# Patient Record
Sex: FEMALE | Race: WHITE
[De-identification: names, ages, dates, MRNs, and addresses within clinical notes are randomized per-mention and may not be internally consistent; named-entity substitution may affect disease eponyms.]

---

## 2018-12-05 ENCOUNTER — HOSPITAL ENCOUNTER (INPATIENT)
Dept: HOSPITAL 47 - 4FBP | Age: 23
LOS: 2 days | Discharge: HOME | End: 2018-12-07
Attending: OBSTETRICS & GYNECOLOGY | Admitting: OBSTETRICS & GYNECOLOGY
Payer: COMMERCIAL

## 2018-12-05 VITALS — BODY MASS INDEX: 39.8 KG/M2

## 2018-12-05 DIAGNOSIS — Z3A.39: ICD-10-CM

## 2018-12-05 DIAGNOSIS — O36.63X0: Primary | ICD-10-CM

## 2018-12-05 DIAGNOSIS — Z23: ICD-10-CM

## 2018-12-05 DIAGNOSIS — Z79.899: ICD-10-CM

## 2018-12-05 LAB
BASOPHILS # BLD AUTO: 0 K/UL (ref 0–0.2)
BASOPHILS NFR BLD AUTO: 0 %
EOSINOPHIL # BLD AUTO: 0.1 K/UL (ref 0–0.7)
EOSINOPHIL NFR BLD AUTO: 1 %
ERYTHROCYTE [DISTWIDTH] IN BLOOD BY AUTOMATED COUNT: 4.04 M/UL (ref 3.8–5.4)
ERYTHROCYTE [DISTWIDTH] IN BLOOD: 15.9 % (ref 11.5–15.5)
HCT VFR BLD AUTO: 36.1 % (ref 34–46)
HGB BLD-MCNC: 11.4 GM/DL (ref 11.4–16)
LYMPHOCYTES # SPEC AUTO: 2 K/UL (ref 1–4.8)
LYMPHOCYTES NFR SPEC AUTO: 17 %
MCH RBC QN AUTO: 28.2 PG (ref 25–35)
MCHC RBC AUTO-ENTMCNC: 31.6 G/DL (ref 31–37)
MCV RBC AUTO: 89.3 FL (ref 80–100)
MONOCYTES # BLD AUTO: 0.5 K/UL (ref 0–1)
MONOCYTES NFR BLD AUTO: 4 %
NEUTROPHILS # BLD AUTO: 9.1 K/UL (ref 1.3–7.7)
NEUTROPHILS NFR BLD AUTO: 76 %
PLATELET # BLD AUTO: 280 K/UL (ref 150–450)
WBC # BLD AUTO: 11.9 K/UL (ref 3.8–10.6)

## 2018-12-05 PROCEDURE — 86900 BLOOD TYPING SEROLOGIC ABO: CPT

## 2018-12-05 PROCEDURE — 90707 MMR VACCINE SC: CPT

## 2018-12-05 PROCEDURE — 00HU33Z INSERTION OF INFUSION DEVICE INTO SPINAL CANAL, PERCUTANEOUS APPROACH: ICD-10-PCS

## 2018-12-05 PROCEDURE — 85025 COMPLETE CBC W/AUTO DIFF WBC: CPT

## 2018-12-05 PROCEDURE — 86850 RBC ANTIBODY SCREEN: CPT

## 2018-12-05 PROCEDURE — 3E0R3BZ INTRODUCTION OF ANESTHETIC AGENT INTO SPINAL CANAL, PERCUTANEOUS APPROACH: ICD-10-PCS

## 2018-12-05 PROCEDURE — 90471 IMMUNIZATION ADMIN: CPT

## 2018-12-05 PROCEDURE — 3E0134Z INTRODUCTION OF SERUM, TOXOID AND VACCINE INTO SUBCUTANEOUS TISSUE, PERCUTANEOUS APPROACH: ICD-10-PCS

## 2018-12-05 PROCEDURE — 88307 TISSUE EXAM BY PATHOLOGIST: CPT

## 2018-12-05 PROCEDURE — 86901 BLOOD TYPING SEROLOGIC RH(D): CPT

## 2018-12-05 RX ADMIN — POTASSIUM CHLORIDE SCH MLS/HR: 14.9 INJECTION, SOLUTION INTRAVENOUS at 13:25

## 2018-12-05 RX ADMIN — POTASSIUM CHLORIDE SCH MLS/HR: 14.9 INJECTION, SOLUTION INTRAVENOUS at 06:23

## 2018-12-05 RX ADMIN — POTASSIUM CHLORIDE SCH MLS/HR: 14.9 INJECTION, SOLUTION INTRAVENOUS at 20:43

## 2018-12-05 NOTE — P.OP
Date of Procedure: 18


Preoperative Diagnosis: 


IUP at 39 and 6/7 weeks, suspected LGA, arrest of first stage of labor


Postoperative Diagnosis: 


Same plus meconium-stained fluid


Procedure(s) Performed: 


Primary low transverse  section


Anesthesia: epidural


Surgeon: Anamaria Medley


Assistant #1: Kinsey Dwyer


Estimated Blood Loss (ml): 600


IV fluids (ml): 800


Urine output (ml): 150


Pathology: other (Placenta)


Condition: stable


Disposition: PACU


Indications for Procedure: 


Patient was noted to be 6 cm for greater than 4 hours with no further descent 

of the fetal head


Operative Findings: 


Meconium-stained fluid, uterus, placenta, normal uterus tubes and ovaries were 

appreciated


Description of Procedure: 


The patient was prepped and draped in the usual fashion after epidural 

anesthesia was found to be adequate.  A Pfannenstiel incision was made and 

extended of the abdominal cavity without difficulty.  The bladder peritoneum 

was elevated and incised and reflected distally.  A 2 cm incision was made in 

the transverse plane of the lower uterine segment to enter the uterus at which 

time meconium stained  fluid was noted.  The incision was extended in both 

directions using the bandage scissors.  The fetal head was encountered within 

the field and delivered up and through the incision where the nose and mouth 

were thoroughly suctioned.  Remainder of the infant was delivered onto the 

surgical field where the cord was doubly clamped, cut, and the infant was 

passed for resuscitative measures with weight 8-13 and Apgars of 8-9 at 1 and 5 

minutes respectively.    The placenta was delivered manually, intact, and was 

grossly normal with a grossly normal three-vessel cord.  The uterus was 

exteriorized and the interior cavity of the uterus swept of any remaining 

placental and membranous fragments with a laparotomy sponge.  The margins of 

the incision were grasped with Allis clamps and the incision closed in 2 

layers.  First layer was a running locking layer of 0 vicryl from margin to 

margin followed by a second layer of imbricating 0 vicryl from margin to 

margin.  Any small points of bleeding were then made hemostatic with the Bovie.

  Once hemostasis was achieved, the posterior cul-de-sac was suctioned with a 

guard and the uterine and ovarian findings are as noted above.  The uterus was 

replaced within the abdominal cavity and the gutters swept of any remaining 

blood fluid or clot.  The incision was again reexamined and hemostasis was 

noted to be excellent.  Any small point of bleeding were made hemostatic with 

the Bovie.  Once hemostasis was achieved the parietal peritoneum was loosely 

reapproximated.  The layer of muscles were examined and made hemostatic with 

the Bovie.  Attention was then turned to the fascia which was closed with 2 

running stitches of 0 Vicryl proceeding from the lateral margins to the 

midpoint.  The subcutaneous tissues were irrigated, made hemostatic with the 

Bovie, and reapproximated with a running stitch of 3-0 vicryl.  The skin was 

reapproximated with 4-0 vicryl.  Estimated blood loss for the case was 

approximately 600 mL.  All sponge instrument and needle counts are correct.  

There were no complications.  The patient tolerated the procedure well and 

proceeded to the recovery room in stable condition.  Both mother and infant are 

resting comfortably in recovery.

## 2018-12-05 NOTE — P.HPOB
History of Present Illness


H&P Date: 18


Chief Complaint: IUP @ 39 6/7 weeks





This is a very pleasant 23-year-old  1 para 0 at 39-6/7 weeks with an 

estimated due date of 6 based on last menstrual period.  Patient has been 

receiving routine prenatal care with myself since 10 weeks of gestation.  

Patient had an ultrasound yesterday for size greater than dates and was noted 

to have an EFW of 8 lbs. 10 oz. with an LUCIANO of 20.  Cervix was noted to be 

favorable 2-3 cm/50/-3 station soft and anterior patient elected induction.





On prenatal blood work patient had a blood type of A+, rubella nonimmune we'll 

plan immunization post delivery, RPR is nonreactive, hepatitis B surface 

antigen is negative, HIV is negative, she did pass her 1 hour GDS she also 

elected to have genetic screening done in the first trimester which was negative

, group beta strep is negative.








Review of Systems


Constitutional: Denies chills, Denies fatigue, Denies fever


Ears, nose, mouth and throat: Denies headache


Cardiovascular: Reports leg edema


Respiratory: Denies cough, Denies dyspnea


Gastrointestinal: Denies constipation, Denies diarrhea, Denies nausea, Denies 

vomiting


Genitourinary: Reports pregnant





Past Medical History


Past Medical History: No Reported History


History of Any Multi-Drug Resistant Organisms: None Reported


Past Surgical History: No Surgical Hx Reported


Past Anesthesia/Blood Transfusion Reactions: No Reported Reaction


Past Psychological History: No Psychological Hx Reported


Smoking Status: Never smoker


Past Alcohol Use History: None Reported


Past Drug Use History: None Reported





- Past Family History


  ** Father


Family Medical History: No Reported History





Medications and Allergies


 Home Medications











 Medication  Instructions  Recorded  Confirmed  Type


 


Pnv No.95/Ferrous Fum/Folic AC 1 tab PO ONCE 18 History





[Prenatal Multivitamin Tablet]    











 Allergies











Allergy/AdvReac Type Severity Reaction Status Date / Time


 


No Known Allergies Allergy   Verified 18 06:06














Exam


Osteopathic Statement: *.  No significant issues noted on an osteopathic 

structural exam other than those noted in the History and Physical/Consult.


 Vital Signs











  Temp Pulse Resp BP Pulse Ox


 


 18 06:09  95.8 F L  94  16  131/82  99








 Intake and Output











 18





 22:59 06:59 14:59


 


Other:   


 


  Weight  105.233 kg 














Targeted physical exam was performed on this date in general this is a well-

nourished well-developed pregnant female in no acute distress, heart has 

regular rate and rhythm, lungs were noted to be clear to auscultation 

bilaterally, abdomen is noted to be gravid and appropriate for gestational age, 

extremities 1+ edema, fetal heart tones are noted to be reactive and she is 

denice every 3 minutes.  On cervical exam her cervix was noted to be 3/50/-

2 amniotomy was performed and clear fluid was obtained without difficulty





Results


Result Diagrams: 


 18 06:20





 Abnormal Lab Results - Last 24 Hours (Table)











  18 Range/Units





  06:20 


 


WBC  11.9 H  (3.8-10.6)  k/uL


 


RDW  15.9 H  (11.5-15.5)  %


 


Neutrophils #  9.1 H  (1.3-7.7)  k/uL














Assessment and Plan


(1) Term pregnancy


Current Visit: Yes   Status: Acute   Code(s): Z34.80 - ENCOUNTER FOR SUPRVSN OF 

NORMAL PREGNANCY, UNSP TRIMESTER   SNOMED Code(s): 75906946


   


Plan: 





Plan Pitocin induction of labor, Stadol as needed for pain control versus 

epidural as patient request.  Anticipate spontaneous vaginal delivery later 

this afternoon.

## 2018-12-06 LAB
BASOPHILS # BLD AUTO: 0 K/UL (ref 0–0.2)
BASOPHILS NFR BLD AUTO: 0 %
EOSINOPHIL # BLD AUTO: 0.1 K/UL (ref 0–0.7)
EOSINOPHIL NFR BLD AUTO: 1 %
ERYTHROCYTE [DISTWIDTH] IN BLOOD BY AUTOMATED COUNT: 3.33 M/UL (ref 3.8–5.4)
ERYTHROCYTE [DISTWIDTH] IN BLOOD: 15.9 % (ref 11.5–15.5)
HCT VFR BLD AUTO: 30 % (ref 34–46)
HGB BLD-MCNC: 9.6 GM/DL (ref 11.4–16)
LYMPHOCYTES # SPEC AUTO: 1.5 K/UL (ref 1–4.8)
LYMPHOCYTES NFR SPEC AUTO: 14 %
MCH RBC QN AUTO: 28.8 PG (ref 25–35)
MCHC RBC AUTO-ENTMCNC: 32 G/DL (ref 31–37)
MCV RBC AUTO: 90.1 FL (ref 80–100)
MONOCYTES # BLD AUTO: 0.5 K/UL (ref 0–1)
MONOCYTES NFR BLD AUTO: 5 %
NEUTROPHILS # BLD AUTO: 8.3 K/UL (ref 1.3–7.7)
NEUTROPHILS NFR BLD AUTO: 79 %
PLATELET # BLD AUTO: 205 K/UL (ref 150–450)
WBC # BLD AUTO: 10.5 K/UL (ref 3.8–10.6)

## 2018-12-06 RX ADMIN — POTASSIUM CHLORIDE SCH: 14.9 INJECTION, SOLUTION INTRAVENOUS at 01:56

## 2018-12-06 RX ADMIN — IBUPROFEN PRN MG: 600 TABLET ORAL at 17:20

## 2018-12-06 RX ADMIN — IBUPROFEN PRN MG: 600 TABLET ORAL at 10:58

## 2018-12-06 RX ADMIN — POTASSIUM CHLORIDE SCH: 14.9 INJECTION, SOLUTION INTRAVENOUS at 22:31

## 2018-12-06 RX ADMIN — DOCUSATE SODIUM AND SENNOSIDES SCH EACH: 50; 8.6 TABLET ORAL at 08:39

## 2018-12-06 RX ADMIN — DOCUSATE SODIUM AND SENNOSIDES SCH: 50; 8.6 TABLET ORAL at 01:56

## 2018-12-06 RX ADMIN — IBUPROFEN PRN MG: 600 TABLET ORAL at 23:35

## 2018-12-06 RX ADMIN — POTASSIUM CHLORIDE SCH MLS/HR: 14.9 INJECTION, SOLUTION INTRAVENOUS at 04:16

## 2018-12-06 RX ADMIN — DOCUSATE SODIUM AND SENNOSIDES SCH: 50; 8.6 TABLET ORAL at 22:32

## 2018-12-06 RX ADMIN — POTASSIUM CHLORIDE SCH: 14.9 INJECTION, SOLUTION INTRAVENOUS at 18:45

## 2018-12-06 NOTE — P.PNOBGPC
Subjective





- Subjective


Principal diagnosis: POD 1 LTCS arrest of labor


Interval history: 





Pt did well overnight, pain is controlled.  she is bottlefeeding.  she has had 

a spontaneous void since eng was d/c.  she notes lochia to be minimal.


she is tolerating clear liquids wihtout n/v.  


she denies concerns 


Patient reports: Reports appetite normal, Reports voiding normally, Reports 

pain well controlled, Reports ambulating normally


: doing well, bottle feeding





Objective





- Vital Signs


Latest vital signs: 


 Vital Signs











  Temp Pulse Resp BP BP Pulse Ox


 


 18 07:49  98.1 F  97  14  133/82  


 


 18 05:00    15    97


 


 18 04:00  98 F  81  15   124/75 


 


 18 03:00    15    98


 


 18 01:00    16   


 


 18 00:00  98.1 F  92  15   138/91 


 


 18 23:00    15    98


 


 18 21:34    16   


 


 18 21:00  96.8 F L  96  18   119/69  97


 


 18 20:30  97.4 F L  110 H  18   121/79  97


 


 18 20:00   96  18   126/57  97


 


 18 19:45   82  18   132/77  97


 


 18 19:35    18    98


 


 18 19:30  98.3 F  84  16   127/72  97


 


 18 19:15   89  18   124/69  98


 


 18 19:00  98.4 F  86  18   118/68  97








 Intake and Output











 18





 22:59 06:59 14:59


 


Intake Total 400  600


 


Output Total 100 350 300


 


Balance 300 -350 300


 


Intake:   


 


  Intake, IV Titration 400  





  Amount   


 


    ACETAMINOPHEN IV (For   





    ) 1,000 mg In Empty Bag 1   





    bag @ 400 mls/hr IVPB   





    ONCE ONE Rx#:[id number]   


 


  Lipid   600


 


    Lactated Ringers 1,000 ml   600





    @ 125 mls/hr IV .Q8H [facility]   





    Rx#:[id number]   


 


Output:   


 


  Urine 100 350 300


 


    Uretheral (Eng)  350 


 


Other:   


 


  Voiding Method Indwelling Catheter  


 


  # Voids   1














- Exam


Extremities: Present: normal


Abdomen: Present: normal appearance, soft


Incision: Present: normal, dry, intact


Uterus: Present: normal, firm





- Labs


Labs: 


 Abnormal Lab Results - Last 24 Hours (Table)











  18 Range/Units





  08:02 


 


RBC  3.33 L  (3.80-5.40)  m/uL


 


Hgb  9.6 L D  (11.4-16.0)  gm/dL


 


Hct  30.0 L  (34.0-46.0)  %


 


RDW  15.9 H  (11.5-15.5)  %


 


Neutrophils #  8.3 H  (1.3-7.7)  k/uL














Assessment and Plan


(1) Term pregnancy


Current Visit: Yes   Status: Acute   Code(s): Z34.80 - ENCOUNTER FOR SUPRVSN OF 

NORMAL PREGNANCY, UNSP TRIMESTER   SNOMED Code(s): 66767680


   





(2) LGA (large for gestational age) fetus


Current Visit: Yes   Status: Acute   Code(s): PIQ2835 -    SNOMED Code(s): 

542672716


   





(3) S/P  section


Current Visit: Yes   Status: Acute   Code(s): Z98.891 - HISTORY OF UTERINE SCAR 

FROM PREVIOUS SURGERY   SNOMED Code(s): 933381563


   


Plan: 





will continue routine post operative care, and anticipate d/c home of she does 

well today

## 2018-12-06 NOTE — P.PN
Progress Note - Text


Progress Note Date: 18


22 yo F status post . Post-op day #1. Patient received intrathecal 

Duramorph through epidural catheter. Patient was seen today, sitting up in bed 

no complaints, pain VAS score 2/10, no headache, no itching, no nausea and 

vomiting.


Assessment and plan: Doing well in general no complications from anesthesia

## 2018-12-07 VITALS
DIASTOLIC BLOOD PRESSURE: 71 MMHG | RESPIRATION RATE: 18 BRPM | TEMPERATURE: 97.8 F | HEART RATE: 92 BPM | SYSTOLIC BLOOD PRESSURE: 117 MMHG

## 2018-12-07 RX ADMIN — HYDROCODONE BITARTRATE AND ACETAMINOPHEN PRN EACH: 5; 325 TABLET ORAL at 02:39

## 2018-12-07 RX ADMIN — HYDROCODONE BITARTRATE AND ACETAMINOPHEN PRN EACH: 5; 325 TABLET ORAL at 10:37

## 2018-12-07 RX ADMIN — IBUPROFEN PRN MG: 600 TABLET ORAL at 07:41

## 2018-12-07 RX ADMIN — DOCUSATE SODIUM AND SENNOSIDES SCH EACH: 50; 8.6 TABLET ORAL at 07:41

## 2018-12-07 RX ADMIN — POTASSIUM CHLORIDE SCH: 14.9 INJECTION, SOLUTION INTRAVENOUS at 06:16

## 2018-12-07 NOTE — P.DS
Providers


Date of admission: 


18 06:04





Expected date of discharge: 18


Attending physician: 


Anamaria Medley





Primary care physician: 


Stated None








- Discharge Diagnosis(es)


(1) Term pregnancy


Current Visit: Yes   Status: Acute   





(2) LGA (large for gestational age) fetus


Current Visit: Yes   Status: Acute   





(3) S/P  section


Current Visit: Yes   Status: Acute   


Hospital Course: 





This is a 23-year-old  1 para 0 at 39 6 when she presented to labor and 

delivery for induction of labor for suspected LGA.  Patient was admitted to 

labor and delivery and was noted to be 3 cm dilated she was denice 

regularly is Pitocin has been started 2 hours prior to me evaluating the 

patient.  Patient underwent amniotomy and clear fluid was obtained.  Patient 

progressed throughout labor and eventually became uncomfortable requesting an 

epidural.  Anesthesia came in place at epidural without difficulty and she was 

comfortable afterwards.  Just after the epidural around 12:30 1:00 patient was 

noted to be 5-6 cm patient stalled at that dilation and around 6:00 was checked 

and felt to be the same dilation.  Patient was then counseled as to primary low 

transverse  section secondary to arrest of first stage of labor.  

Patient agreed and  was performed.  For further details on the C-

section please see the operative report.  Patient delivered a viable female 

infant at 1831, weight of 8 lbs. 13 oz. and Apgars of 8 and 9 at one and 5 

minutes respectively.  Patient's postoperative course has been uneventful.  She 

is ambulating and voiding without difficulty.  She is tolerating a regular diet 

without nausea or vomiting.  She states her pain is controlled with oral 

medications..  She is bottle feeding at this time.  She is tired but states she 

is ready to go home on this postop day #2.


Patient Condition at Discharge: Good





Plan - Discharge Summary


New Discharge Prescriptions: 


No Action


   Pnv No.95/Ferrous Fum/Folic AC [Prenatal Multivitamin Tablet] 1 tab PO ONCE


Discharge Medication List





Pnv No.95/Ferrous Fum/Folic AC [Prenatal Multivitamin Tablet] 1 tab PO ONCE  [History]








Follow up Appointment(s)/Referral(s): 


Anamaria Medley DO [Doctor of Osteopathic Medicine] - 2 Weeks


Patient Instructions/Handouts:   (DC),  (GEN)

## 2020-08-13 ENCOUNTER — HOSPITAL ENCOUNTER (INPATIENT)
Dept: HOSPITAL 47 - 4FBP | Age: 25
LOS: 2 days | Discharge: HOME | End: 2020-08-15
Attending: OBSTETRICS & GYNECOLOGY | Admitting: OBSTETRICS & GYNECOLOGY
Payer: COMMERCIAL

## 2020-08-13 VITALS — BODY MASS INDEX: 43.7 KG/M2

## 2020-08-13 DIAGNOSIS — O36.63X0: ICD-10-CM

## 2020-08-13 DIAGNOSIS — O34.211: Primary | ICD-10-CM

## 2020-08-13 DIAGNOSIS — Z3A.39: ICD-10-CM

## 2020-08-13 LAB
BASOPHILS # BLD AUTO: 0 K/UL (ref 0–0.2)
BASOPHILS NFR BLD AUTO: 0 %
EOSINOPHIL # BLD AUTO: 0.1 K/UL (ref 0–0.7)
EOSINOPHIL NFR BLD AUTO: 1 %
ERYTHROCYTE [DISTWIDTH] IN BLOOD BY AUTOMATED COUNT: 4.07 M/UL (ref 3.8–5.4)
ERYTHROCYTE [DISTWIDTH] IN BLOOD: 15.3 % (ref 11.5–15.5)
HCT VFR BLD AUTO: 35.4 % (ref 34–46)
HGB BLD-MCNC: 11.2 GM/DL (ref 11.4–16)
LYMPHOCYTES # SPEC AUTO: 1.6 K/UL (ref 1–4.8)
LYMPHOCYTES NFR SPEC AUTO: 18 %
MCH RBC QN AUTO: 27.6 PG (ref 25–35)
MCHC RBC AUTO-ENTMCNC: 31.7 G/DL (ref 31–37)
MCV RBC AUTO: 87.1 FL (ref 80–100)
MONOCYTES # BLD AUTO: 0.3 K/UL (ref 0–1)
MONOCYTES NFR BLD AUTO: 4 %
NEUTROPHILS # BLD AUTO: 6.6 K/UL (ref 1.3–7.7)
NEUTROPHILS NFR BLD AUTO: 75 %
PLATELET # BLD AUTO: 242 K/UL (ref 150–450)
WBC # BLD AUTO: 8.8 K/UL (ref 3.8–10.6)

## 2020-08-13 PROCEDURE — 85025 COMPLETE CBC W/AUTO DIFF WBC: CPT

## 2020-08-13 PROCEDURE — 86850 RBC ANTIBODY SCREEN: CPT

## 2020-08-13 PROCEDURE — 86901 BLOOD TYPING SEROLOGIC RH(D): CPT

## 2020-08-13 PROCEDURE — 86900 BLOOD TYPING SEROLOGIC ABO: CPT

## 2020-08-13 RX ADMIN — POTASSIUM CHLORIDE SCH: 14.9 INJECTION, SOLUTION INTRAVENOUS at 21:22

## 2020-08-13 RX ADMIN — POTASSIUM CHLORIDE SCH MLS/HR: 14.9 INJECTION, SOLUTION INTRAVENOUS at 19:35

## 2020-08-13 RX ADMIN — POTASSIUM CHLORIDE SCH: 14.9 INJECTION, SOLUTION INTRAVENOUS at 21:24

## 2020-08-13 RX ADMIN — DOCUSATE SODIUM AND SENNOSIDES SCH EACH: 50; 8.6 TABLET ORAL at 21:16

## 2020-08-13 NOTE — P.OP
Date of Procedure: 20


Preoperative Diagnosis: 


IUP at 39 0/sevenths weeks, history of  1, desires repeat


Postoperative Diagnosis: 


Same


Procedure(s) Performed: 


Elective repeat  section


Anesthesia: spinal


Surgeon: Anamaria Medley


Assistant #1: Cain Gage


Estimated Blood Loss (ml): 460


IV fluids (ml): 1,000


Urine output (ml): 300


Pathology: none sent


Condition: stable


Disposition: observation


Indications for Procedure: 


This pleasant 24-year-old  2 para 1001 has a history of a primary C-

section secondary to arrest of first stage of labor, patient elected repeat C-

section secondary to suspected large for gestational age infant.


Operative Findings: 


Normal uterus tubes and ovaries were appreciated.  A thin lower uterine segment 

was noted, liveborn infant male delivered at 1229, weight of 9 lbs. 5 oz. via 

vacuum assist,


Description of Procedure: 


Patient was taken back to the operating suite where spinal anesthesia was found 

be adequate by the anesthesia department.  She was then prepped and draped in 

normal sterile fashion in the dorsal supine position.  A Pfannenstiel skin 

incision was made the scalpel and carried through to the underlying layer of 

fascia.  A significant amount of citric's was noted within the subcutaneous 

tissue.  The fascia was then incised in the midline and the incision was 

extended laterally.  The superior aspect of the fascial incision was then 

grasped with Stanwood clamps, elevated and underlying rectus muscles dissected off 

sharply.  Attention was then turned to the inferior aspect of the fascial 

incision which was grasped with Stanwood clamps, elevated and underlying rectus 

muscles dissected off sharply once again.  The rectus muscles were  in 

the midline the peritoneum was identified and entered.  This incision was then 

extended superiorly and inferiorly with good visualization the bladder.  The 

bladder blade was then inserted into the pelvis the vesicouterine peritoneum was

identified and the bladder flap was then created.  The bladder blade was then 

reinserted into the pelvis.  Hysterotomy incision was made with the scalpel 

amniotomy was performed and clear fluid was obtained.  Fetal head was 

encountered and after an attempt to deliver the infant a vacuum was placed 

infant was delivered atraumatically without a pop-off.  Vacuum was deactivated, 

the umbo cord was doubly clamped and cut and infant was handed off to waiting 

RN.  The placenta was then delivered manually and the uterus was cleared of all 

clots and debris.  The uterine incision was then closed with 0 Vicryl 2.  Small

amount of bleeding was noted in the midportion of the uterine incision therefore

a figure-of-eight suture was used to obtain hemostasis.  The uterus then 

returned to the abdomen.  The gutters were cleared of all clots and debris.  The

hysterotomy incision was inspected and hemostasis was appreciated.  The 

peritoneum was then loosely reapproximated.  The fascia was then closed with 0 

Vicryl in a running suture from one lateral edge the other.  The subcutaneous 

tissue was irrigated and found to be hemostatic.  It was closed with 3-0 Vicryl.

 The skin was then closed with 4-0 Vicryl in a subsequent fashion.  Steri-Strips

and sterile dressings were applied as needed.  


All counts were noted to be correct 2 to the end of the procedure.


  Patient and infant tolerated delivery well.

## 2020-08-13 NOTE — P.HPOB
History of Present Illness


H&P Date: 20


Chief Complaint: IUP at 39 and 0/sevenths weeks, history of  1 desires

repeat





This is a 24-year-old  2 para 1001 at 39-0/7 weeks that presents to labor

and delivery for planned repeat .  Patient and a prior  

secondary to arrest of first stage of labor.  Patient has been receiving routine

prenatal care which is been essentially uncomplicated.  Ultrasound was completed

approximately 2 weeks ago revealing a large for gestational age infant at 8 lbs.

9 oz.  Patient is known to have a blood type of A+, Hurst rubella status is 

immune, hep Judi surface antigen negative, GBS negative, HIV negative, RPR 

nonreactive.


  Patient did note good fetal movement upon arrival today she denied 

contractions or loss of fluid.





Review of Systems


Constitutional: Denies chills, Denies fatigue, Denies fever


Ears, nose, mouth and throat: Denies headache


Cardiovascular: Reports leg edema


Respiratory: Denies dyspnea


Gastrointestinal: Denies diarrhea, Denies nausea, Denies vomiting


Genitourinary: Reports pregnant





Past Medical History


Past Medical History: No Reported History


History of Any Multi-Drug Resistant Organisms: None Reported


Past Surgical History:  Section


Past Anesthesia/Blood Transfusion Reactions: No Reported Reaction


Past Psychological History: No Psychological Hx Reported


Smoking Status: Never smoker


Past Alcohol Use History: None Reported


Past Drug Use History: None Reported





- Past Family History


  ** Father


Family Medical History: No Reported History





Medications and Allergies


                                Home Medications











 Medication  Instructions  Recorded  Confirmed  Type


 


Pnv No.95/Ferrous Fum/Folic AC 1 tab PO ONCE 18 History





[Prenatal Multivitamin Tablet]    








                                    Allergies











Allergy/AdvReac Type Severity Reaction Status Date / Time


 


No Known Allergies Allergy   Verified 20 14:44














Exam


Osteopathic Statement: *.  No significant issues noted on an osteopathic 

structural exam other than those noted in the History and Physical/Consult.


                                   Vital Signs











  Temp Pulse Resp BP Pulse Ox


 


 20 12:00    16  


 


 20 10:23  97.2 F L  93  16  120/73  96








                                Intake and Output











 20





 22:59 06:59 14:59


 


Other:   


 


  Weight   115.666 kg














Targeted physical exam was performed in this date and generalist a well-

nourished well-developed pregnant female in no distress, breathing is noted to 

be nonlabored, heart has a regular rate and rhythm, abdomen is gravid and 

appropriate for gestational age, fetal heart tones are be category 1 and she is 

not denice, cervical exam is deferred as she is a planned .





Results


Result Diagrams: 


                                 20 10:30





                  Abnormal Lab Results - Last 24 Hours (Table)











  20 Range/Units





  10:30 


 


Hgb  11.2 L  (11.4-16.0)  gm/dL














Assessment and Plan


(1) H/O  section


Current Visit: Yes   Status: Acute   Code(s): Z98.891 - HISTORY OF UTERINE SCAR 

FROM PREVIOUS SURGERY   SNOMED Code(s): 448338601


   





(2) LGA (large for gestational age) fetus


Current Visit: No   Status: Acute   Code(s): LNW6894 -    SNOMED Code(s): 

259376346


   





(3) Term pregnancy


Current Visit: No   Status: Acute   Code(s): Z34.80 - ENCOUNTER FOR SUPRVSN OF 

NORMAL PREGNANCY, UNSP TRIMESTER   SNOMED Code(s): 56764391


   


Plan: 





Patient is admitted to labor and delivery for planned repeat  section.  

Patient is counseled on risks of repeat  including but not limited to 

infection, bleeding, damage to bladder, bowel, ureteric, fetal injury.  Patient 

states understanding and wishes to proceed.

## 2020-08-14 LAB
BASOPHILS # BLD AUTO: 0 K/UL (ref 0–0.2)
BASOPHILS NFR BLD AUTO: 0 %
EOSINOPHIL # BLD AUTO: 0.1 K/UL (ref 0–0.7)
EOSINOPHIL NFR BLD AUTO: 1 %
ERYTHROCYTE [DISTWIDTH] IN BLOOD BY AUTOMATED COUNT: 3.38 M/UL (ref 3.8–5.4)
ERYTHROCYTE [DISTWIDTH] IN BLOOD: 15.5 % (ref 11.5–15.5)
HCT VFR BLD AUTO: 29.6 % (ref 34–46)
HGB BLD-MCNC: 9.5 GM/DL (ref 11.4–16)
LYMPHOCYTES # SPEC AUTO: 1.9 K/UL (ref 1–4.8)
LYMPHOCYTES NFR SPEC AUTO: 21 %
MCH RBC QN AUTO: 28 PG (ref 25–35)
MCHC RBC AUTO-ENTMCNC: 31.9 G/DL (ref 31–37)
MCV RBC AUTO: 87.8 FL (ref 80–100)
MONOCYTES # BLD AUTO: 0.5 K/UL (ref 0–1)
MONOCYTES NFR BLD AUTO: 6 %
NEUTROPHILS # BLD AUTO: 6.4 K/UL (ref 1.3–7.7)
NEUTROPHILS NFR BLD AUTO: 70 %
PLATELET # BLD AUTO: 221 K/UL (ref 150–450)
WBC # BLD AUTO: 9.2 K/UL (ref 3.8–10.6)

## 2020-08-14 RX ADMIN — DOCUSATE SODIUM AND SENNOSIDES SCH EACH: 50; 8.6 TABLET ORAL at 19:43

## 2020-08-14 RX ADMIN — IBUPROFEN PRN MG: 600 TABLET ORAL at 10:46

## 2020-08-14 RX ADMIN — DOCUSATE SODIUM AND SENNOSIDES SCH EACH: 50; 8.6 TABLET ORAL at 08:21

## 2020-08-14 RX ADMIN — IBUPROFEN PRN MG: 600 TABLET ORAL at 04:16

## 2020-08-14 RX ADMIN — POTASSIUM CHLORIDE SCH: 14.9 INJECTION, SOLUTION INTRAVENOUS at 21:24

## 2020-08-14 RX ADMIN — POTASSIUM CHLORIDE SCH: 14.9 INJECTION, SOLUTION INTRAVENOUS at 21:23

## 2020-08-14 RX ADMIN — IBUPROFEN PRN MG: 600 TABLET ORAL at 17:32

## 2020-08-14 RX ADMIN — HYDROCODONE BITARTRATE AND ACETAMINOPHEN PRN EACH: 5; 325 TABLET ORAL at 19:42

## 2020-08-14 NOTE — P.PNOBGPC
Subjective





- Subjective


Principal diagnosis: Postop day 1, repeat  section


Interval history: 





Patient did well overnight.  She is ambulating and voiding without difficulty.  

She states she is tolerating a regular diet without nausea or vomiting, her pain

is well-controlled.  Her lochia is minimal.  She denies concerns.


Patient reports: Reports appetite normal, Reports voiding normally, Reports pain

well controlled, Reports ambulating normally


: doing well





Objective





- Vital Signs


Latest vital signs: 


                                   Vital Signs











  Temp Pulse Resp BP Pulse Ox


 


 20 08:00  98.2 F  86  16  106/67  98


 


 20 06:00    18   98


 


 20 04:00  98.5 F  83  18  110/62  98


 


 20 02:00    18   97


 


 20 00:00  98.0 F  80  18  105/66  98


 


 20 22:00    16   97


 


 20 20:00  98.2 F  80  16  121/74  98


 


 20 18:00    16   99


 


 20 15:29    16  


 


 20 15:00   86  16  112/63  99


 


 20 14:30   85  16  116/58 


 


 20 14:00   86  16  119/65  100


 


 20 13:45   81  16  122/61  99


 


 20 13:30   88  16  113/63  99


 


 20 13:15   100  16  125/60  99


 


 20 13:00  96.7 F L  86  16  122/58  100


 


 20 12:00    16  


 


 20 10:23  97.2 F L  93  16  120/73  96








                                Intake and Output











 20





 22:59 06:59 14:59


 


Output Total 1450 1000 


 


Balance -1450 -1000 


 


Output:   


 


  Urine 1450 1000 


 


    Uretheral (George) 1300  


 


Other:   


 


  # Voids  1 1














- Exam


Extremities: Present: normal


Abdomen: Present: normal appearance, soft


Incision: Present: normal, dry, intact


Uterus: Present: normal, firm





- Labs


Labs: 


                  Abnormal Lab Results - Last 24 Hours (Table)











  20 Range/Units





  10:30 05:53 


 


RBC   3.38 L  (3.80-5.40)  m/uL


 


Hgb  11.2 L  9.5 L D  (11.4-16.0)  gm/dL


 


Hct   29.6 L  (34.0-46.0)  %














Assessment and Plan


(1) H/O  section


Current Visit: Yes   Status: Acute   Code(s): Z98.891 - HISTORY OF UTERINE SCAR 

FROM PREVIOUS SURGERY   SNOMED Code(s): 086018926


   





(2) LGA (large for gestational age) fetus


Current Visit: No   Status: Acute   Code(s): GPV2734 -    SNOMED Code(s): 

462827171


   





(3) Term pregnancy


Current Visit: No   Status: Acute   Code(s): Z34.80 - ENCOUNTER FOR SUPRVSN OF 

NORMAL PREGNANCY, UNSP TRIMESTER   SNOMED Code(s): 14631870


   





(4) S/P  section


Current Visit: No   Status: Acute   Code(s): Z98.891 - HISTORY OF UTERINE SCAR 

FROM PREVIOUS SURGERY   SNOMED Code(s): 076609330


   


Plan: 





Patient is doing well postoperatively, will continue routine postoperative care 

and anticipate discharge home tomorrow.

## 2020-08-15 VITALS
SYSTOLIC BLOOD PRESSURE: 104 MMHG | TEMPERATURE: 97.8 F | HEART RATE: 71 BPM | DIASTOLIC BLOOD PRESSURE: 70 MMHG | RESPIRATION RATE: 14 BRPM

## 2020-08-15 RX ADMIN — IBUPROFEN PRN MG: 600 TABLET ORAL at 07:34

## 2020-08-15 RX ADMIN — HYDROCODONE BITARTRATE AND ACETAMINOPHEN PRN EACH: 5; 325 TABLET ORAL at 00:34

## 2020-08-15 RX ADMIN — HYDROCODONE BITARTRATE AND ACETAMINOPHEN PRN EACH: 5; 325 TABLET ORAL at 04:51

## 2020-08-15 NOTE — P.DS
Providers


Date of admission: 


20 10:10





Expected date of discharge: 08/15/20


Attending physician: 


Anamaria Medley





Primary care physician: 


Stated None








- Discharge Diagnosis(es)


(1) H/O  section


Current Visit: Yes   Status: Acute   





(2) LGA (large for gestational age) fetus


Current Visit: No   Status: Acute   





(3) Term pregnancy


Current Visit: No   Status: Acute   





(4) S/P  section


Current Visit: No   Status: Acute   


Hospital Course: 





This is a pleasant 24-year-old  2 para 1001 with a history of primary C-

section secondary to arrest of first stage and suspected LGA.  Patient elected 

repeat  for this pregnancy.  Patient was admitted to labor and delivery

and repeat  was performed without difficulty.  For further details on 

the  please see the operative report.  She delivered a liveborn male 

infant at 1229, weight of 9 lbs. 5 oz. via vacuum assist.  Patient's 

postoperative course has been uneventful.  On this postoperative day #1 she is 

ambulating and voiding without difficulty.  She is tolerating a regular diet 

without nausea or vomiting.  She states her pain is well-controlled and she does

wish discharge home. 


Patient Condition at Discharge: Good





Plan - Discharge Summary


Discharge Rx Participant: Yes


New Discharge Prescriptions: 


No Action


   Pnv No.95/Ferrous Fum/Folic AC [Prenatal Multivitamin Tablet] 1 tab PO ONCE


Discharge Medication List





Pnv No.95/Ferrous Fum/Folic AC [Prenatal Multivitamin Tablet] 1 tab PO ONCE 

18 [History]








Follow up Appointment(s)/Referral(s): 


Anamaria Medley DO [Doctor of Osteopathic Medicine] - 2 Weeks


Patient Instructions/Handouts:   (DC),  (GEN)

## 2021-11-07 ENCOUNTER — HOSPITAL ENCOUNTER (EMERGENCY)
Dept: HOSPITAL 47 - EC | Age: 26
Discharge: HOME | End: 2021-11-07
Payer: COMMERCIAL

## 2021-11-07 VITALS
HEART RATE: 82 BPM | DIASTOLIC BLOOD PRESSURE: 72 MMHG | RESPIRATION RATE: 18 BRPM | TEMPERATURE: 98.3 F | SYSTOLIC BLOOD PRESSURE: 136 MMHG

## 2021-11-07 DIAGNOSIS — J06.9: Primary | ICD-10-CM

## 2021-11-07 DIAGNOSIS — Z20.822: ICD-10-CM

## 2021-11-07 PROCEDURE — 87636 SARSCOV2 & INF A&B AMP PRB: CPT

## 2021-11-07 PROCEDURE — 99284 EMERGENCY DEPT VISIT MOD MDM: CPT

## 2021-11-07 PROCEDURE — 71046 X-RAY EXAM CHEST 2 VIEWS: CPT

## 2021-11-07 NOTE — XR
EXAMINATION TYPE: XR chest 2V

 

DATE OF EXAM: 11/7/2021

 

COMPARISON: NONE

 

HISTORY: Cough and congestion

 

TECHNIQUE: 2 views

 

FINDINGS: Heart and mediastinum are normal. Lungs are clear. Diaphragm is normal. Bony thorax is inta
ct.

 

IMPRESSION: Normal chest.

## 2021-11-07 NOTE — ED
URI HPI





- General


Chief Complaint: Upper Respiratory Infection


Stated Complaint: Cough


Time Seen by Provider: 21 15:45


Source: patient, RN notes reviewed


Mode of arrival: ambulatory


Limitations: no limitations





- History of Present Illness


Initial Comments: 





Patient is a 25-year-old female that presents to the emergency department 

complaining of upper respiratory tract symptoms for the past 4 days.  She noted 

that she's been taking over-the-counter cough and cold medicine.  She notes that

she does work at  and she notes that she has been running a mild fever 

with a cough and runny nose.  She denied other symptoms.  She was well-

appearing.  She denied chest pain first breath headache nausea vomiting diarrhea

constipation fatigue chills.





- Related Data


                                Home Medications











 Medication  Instructions  Recorded  Confirmed


 


Pnv No.95/Ferrous Fum/Folic AC 1 tab PO ONCE 18





[Prenatal Multivitamin Tablet]   











                                    Allergies











Allergy/AdvReac Type Severity Reaction Status Date / Time


 


No Known Allergies Allergy   Verified 21 15:43














Review of Systems


ROS Statement: 


Those systems with pertinent positive or pertinent negative responses have been 

documented in the HPI.





ROS Other: All systems not noted in ROS Statement are negative.





Past Medical History


Past Medical History: No Reported History


History of Any Multi-Drug Resistant Organisms: None Reported


Past Surgical History:  Section


Past Anesthesia/Blood Transfusion Reactions: No Reported Reaction


Past Psychological History: No Psychological Hx Reported


Smoking Status: Never smoker


Past Alcohol Use History: None Reported


Past Drug Use History: None Reported





- Past Family History


  ** Father


Family Medical History: No Reported History





General Exam


Limitations: no limitations


General appearance: alert, in no apparent distress


Head exam: Present: atraumatic, normocephalic, normal inspection


Eye exam: Present: normal appearance, PERRL, EOMI.  Absent: scleral icterus, 

conjunctival injection, periorbital swelling


ENT exam: Present: normal exam, mucous membranes moist


Neck exam: Present: normal inspection


Respiratory exam: Present: normal lung sounds bilaterally.  Absent: respiratory 

distress, wheezes, rales, rhonchi, stridor


Cardiovascular Exam: Present: regular rate, normal rhythm, normal heart sounds. 

Absent: systolic murmur, diastolic murmur, rubs, gallop, clicks


Extremities exam: Present: normal inspection, full ROM, normal capillary refill.

 Absent: tenderness, pedal edema, joint swelling, calf tenderness


Neurological exam: Present: alert, oriented X3


Psychiatric exam: Present: normal affect, normal mood


Skin exam: Present: warm, dry, intact, normal color.  Absent: rash





Course





                                   Vital Signs











  21





  15:40


 


Temperature 98.5 F


 


Pulse Rate 88


 


Respiratory 22





Rate 


 


Blood Pressure 138/76


 


O2 Sat by Pulse 98





Oximetry 














Medical Decision Making





- Medical Decision Making





25-year-old female with upper respiratory tract symptoms for the past 5 days, 

works in .


Cepheid 4 Plex, chest x-ray ordered.


Cepheid 4 Plex negative.


Chest x-ray negative for any acute card up on her process.


Patient most likely has an acute upper respiratory infection caused by a virus 

other than Covid flu or RSV.


Case discussed with Dr. Avitia, patient discharge home.





- Lab Data





                                   Lab Results











  21 Range/Units





  16:07 


 


Influenza Type A (PCR)  Not Detected  (Not Detectd)  


 


Influenza Type B (PCR)  Not Detected  (Not Detectd)  


 


RSV (PCR)  Not Detected  (Not Detectd)  


 


SARS-CoV-2 (PCR)  Not Detected  (Not Detectd)  














- Radiology Data


Radiology results: report reviewed, image reviewed





Chest x-ray: Normal chest.





Disposition


Clinical Impression: 


 Acute upper respiratory infection





Disposition: HOME SELF-CARE


Condition: Stable


Instructions (If sedation given, give patient instructions):  Upper Respiratory 

Infection (ED)


Additional Instructions: 


Please return to the Emergency Department if symptoms worsen or any other 

concerns.


Follow-up with primary care 1-2 days.


Continue taking over-the-counter cough medicine as needed.





Is patient prescribed a controlled substance at d/c from ED?: No


Referrals: 


Leslie Fragoso MD [Primary Care Provider] - 1-2 days


Time of Disposition: 17:10

## 2022-05-25 ENCOUNTER — HOSPITAL ENCOUNTER (OUTPATIENT)
Dept: HOSPITAL 47 - SLEEP | Age: 27
End: 2022-05-25
Attending: INTERNAL MEDICINE
Payer: COMMERCIAL

## 2022-05-25 DIAGNOSIS — T78.40XA: ICD-10-CM

## 2022-05-25 DIAGNOSIS — Z86.69: ICD-10-CM

## 2022-05-25 DIAGNOSIS — R40.0: Primary | ICD-10-CM

## 2022-05-25 DIAGNOSIS — Z86.16: ICD-10-CM

## 2022-05-25 DIAGNOSIS — Z87.09: ICD-10-CM

## 2022-05-25 DIAGNOSIS — E66.9: ICD-10-CM

## 2022-05-25 DIAGNOSIS — F41.9: ICD-10-CM

## 2022-05-25 DIAGNOSIS — F32.A: ICD-10-CM

## 2022-05-25 DIAGNOSIS — Z87.59: ICD-10-CM

## 2022-05-26 NOTE — SFUN
SLEEP CENTER FOLLOW UP NOTE



DATE OF SERVICE:

2022



This 26-year-old lady has been followed in Sleep Center and comes in to discuss results

of her sleep study and following plan.



I discussed results of sleep study with the patient in detail.  No significant

abnormalities of respiration.  No significant periodic limb movements during the sleep

study.  Multiple sleep latency test confirmed pathological sleepiness although without

sleep-onset REM periods.  At present patient is on treatment with Adderall 20 mg twice

a day, but with this regimen she still continues to feel sleepiness.  [place]

Sleepiness Scale is in the range of 16.



MEDICATIONS:

Zoloft 100 mg once a day, Adderall 20 mg twice a day, Zyrtec once a day.



PHYSICAL EXAMINATION:

GENERAL: Pleasant patient in no distress.

VITAL SIGNS: /80, HR 89, RR 16, height 5 feet 4-3/4  inches, weight 227.6,

temperature 97.4, oxygen saturation at room air 95%.

HEENT: PERRLA, EOMI, evaluation of oropharynx showed tongue protrudes midline. Low

position of soft palate; Mallampati III.

NECK:  Supple, no JVD.  Thyroid is not palpable.

LUNGS:  Clear to percussion and to auscultation.  Good air exchange.  No wheezing or

rhonchi.

HEART:  S1, S2 regular.  No murmurs, gallops, or rubs.

ABDOMEN:  Slightly obese.

EXTREMITIES: No clubbing or cyanosis.

CNS:  Awake, alert, and oriented X3.  Cranial nerves 2 to 7 intact.  There is no

fasciculation or atrophy. noted.  No focal deficits observed.



IMPRESSION:

1. Multiple sleep latency test confirmed pathological sleepiness which is in extremely

    short range, 2.2 minutes.  No sleep-onset REM periods have been documented, but

    most probably it is still narcolepsy because sleep latency is extremely short.

2. No significant periodic limb movements were documented during the sleep the sleep

    study. No periodic limb movements have been documented.

3. Obesity.

4. Status post COVID-19 in .

5. History of bronchitis.

6. History of headaches.

7. History of depression and anxiety.

8. Allergies.

9. Status post  x2.



PLAN:

1. Dose of Adderall will be increased to 20 mg 3 times a day.

2. Sleep hygiene with regular time in bed for at least 7-1/2 to 8 hours.

3. Daytime naps permitted.

4. No driving if feeling any sleepiness.  Patient is aware of civil and criminal

    liability for unsafe driving.

Thank you very much for allowing me to participate in the management of your patient.



Sincerely,







Jaquan Lombardi MD, PhD, FAASM

Diplomat of [employer]

Sleep Medicine Board of [employer] of [employer]

Medical Director of [facility]





MMODL / IJN: [id number] / Job#: [id number]

## 2024-08-07 ENCOUNTER — HOSPITAL ENCOUNTER (INPATIENT)
Dept: HOSPITAL 47 - 4FBP | Age: 29
LOS: 2 days | Discharge: HOME | DRG: 540 | End: 2024-08-09
Attending: OBSTETRICS & GYNECOLOGY | Admitting: OBSTETRICS & GYNECOLOGY
Payer: COMMERCIAL

## 2024-08-07 DIAGNOSIS — F90.9: ICD-10-CM

## 2024-08-07 DIAGNOSIS — Z3A.39: ICD-10-CM

## 2024-08-07 DIAGNOSIS — K21.9: ICD-10-CM

## 2024-08-07 DIAGNOSIS — O34.211: Primary | ICD-10-CM

## 2024-08-07 PROCEDURE — 86900 BLOOD TYPING SEROLOGIC ABO: CPT

## 2024-08-07 PROCEDURE — 85025 COMPLETE CBC W/AUTO DIFF WBC: CPT

## 2024-08-07 PROCEDURE — 86901 BLOOD TYPING SEROLOGIC RH(D): CPT

## 2024-08-07 PROCEDURE — 86850 RBC ANTIBODY SCREEN: CPT

## 2024-10-30 NOTE — P.OP
Date of Procedure: 24


Preoperative Diagnosis: 


IUP at 39 and 0, history of  x 2, spontaneous rupture of membranes


Postoperative Diagnosis: 


Same


Procedure(s) Performed: 


Repeat  section


Anesthesia: spinal


Surgeon: Anamaria Medley


Estimated Blood Loss (ml): 294


IV fluids (ml): 800


Urine output (ml): 50 (Clear yellow)


Pathology: none sent


Condition: stable


Disposition: observation


Indications for Procedure: 


History of  section x 2, desires repeat


Operative Findings: 


Viable male infant, weight of 8 pounds 4 ounces, Apgars of 8 and 9 at 1 and 5 

minutes respectively.


Description of Procedure: 


The patient was prepped and draped in the usual fashion after spinal anesthesia 

was administered by the anesthesia department.  A Pfannenstiel incision was made

and extended of the abdominal cavity without difficulty.  The bladder peritoneum

was elevated and incised and reflected distally.  A 2 cm incision was made in 

the transverse plane of the lower uterine segment to enter the uterus at which 

time clear fluid was noted.  The incision was extended in both directions using 

the bandage scissors.  The fetal head was encountered within the field and 

delivered up and through the incision where the nose and mouth were thoroughly 

suctioned.  Remainder of the infant was delivered onto the surgical field where 

the cord was doubly clamped, cut, and the infant was passed for resuscitative 

measures with weight and Apgars as noted above.  A segment of cord was then 

doubly clamped, cut, and set aside should cord gases become necessary.  The 

placenta was delivered manually, intact, and was grossly normal with a grossly 

normal three-vessel cord.  The uterus was exteriorized and the interior cavity 

of the uterus swept of any remaining placental and membranous fragments with a 

laparotomy sponge.  The margins of the incision were grasped with Siddiqi 

clamps and the incision closed in 2 layers.  First layer was a running locking 

layer of 0 Vicryl from margin to margin followed by a second layer of 

imbricating 0 Vicryl from margin to margin.  Any small points of bleeding were 

then made hemostatic with the Bovie.  Once hemostasis was achieved, the 

posterior cul-de-sac was suctioned with a guard and the uterine and ovarian 

findings are as noted above.  The uterus was replaced within the abdominal 

cavity and the gutters swept of any remaining blood fluid or clot.  The incision

was again reexamined and hemostasis was noted to be excellent.  Any small point 

of bleeding were made hemostatic with the Bovie.  Once hemostasis was achieved 

the parietal peritoneum was loosely reapproximated.  The layer of muscles were 

examined and made hemostatic with the Bovie.  Attention was then turned to the 

fascia which was closed with 2 running stitches of 0 Vicryl proceeding from the 

lateral margins to the midpoint.  The subcutaneous tissues were irrigated, made 

hemostatic with the Bovie, and reapproximated with a running stitch of 30 

Vicryl.  The skin was reapproximated with 4-0 Vicryl.  Estimated blood loss for 

the case was approximately 294 mL.  All sponge instrument and needle counts are 

correct.  There were no complications.  The patient tolerated the procedure well

and proceeded to the recovery room in stable condition.  Both mother and infant 

are resting comfortably in recovery. oral

## 2025-07-10 ENCOUNTER — HOSPITAL ENCOUNTER (OUTPATIENT)
Dept: HOSPITAL 47 - 3 N SLEEP | Age: 30
End: 2025-07-10
Payer: COMMERCIAL

## 2025-07-10 VITALS
TEMPERATURE: 99.1 F | RESPIRATION RATE: 16 BRPM | DIASTOLIC BLOOD PRESSURE: 66 MMHG | SYSTOLIC BLOOD PRESSURE: 105 MMHG | HEART RATE: 106 BPM

## 2025-07-10 DIAGNOSIS — E78.5: ICD-10-CM

## 2025-07-10 DIAGNOSIS — Z98.890: ICD-10-CM

## 2025-07-10 DIAGNOSIS — G47.419: Primary | ICD-10-CM

## 2025-07-10 DIAGNOSIS — R53.1: ICD-10-CM

## 2025-07-10 DIAGNOSIS — K21.9: ICD-10-CM

## 2025-07-10 DIAGNOSIS — I62.9: ICD-10-CM

## 2025-07-10 PROCEDURE — 99211 OFF/OP EST MAY X REQ PHY/QHP: CPT
